# Patient Record
Sex: FEMALE | Race: WHITE | NOT HISPANIC OR LATINO | URBAN - METROPOLITAN AREA
[De-identification: names, ages, dates, MRNs, and addresses within clinical notes are randomized per-mention and may not be internally consistent; named-entity substitution may affect disease eponyms.]

---

## 2023-11-11 ENCOUNTER — OFFICE VISIT (OUTPATIENT)
Dept: URGENT CARE | Facility: CLINIC | Age: 18
End: 2023-11-11
Payer: COMMERCIAL

## 2023-11-11 VITALS
TEMPERATURE: 98 F | SYSTOLIC BLOOD PRESSURE: 116 MMHG | HEIGHT: 59 IN | RESPIRATION RATE: 18 BRPM | DIASTOLIC BLOOD PRESSURE: 78 MMHG | OXYGEN SATURATION: 100 % | BODY MASS INDEX: 34.27 KG/M2 | WEIGHT: 170 LBS | HEART RATE: 100 BPM

## 2023-11-11 DIAGNOSIS — M25.572 ACUTE LEFT ANKLE PAIN: ICD-10-CM

## 2023-11-11 DIAGNOSIS — R52 PAIN: ICD-10-CM
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
## 2023-11-11 DIAGNOSIS — M25.472 LEFT ANKLE SWELLING: ICD-10-CM

## 2023-11-11 DIAGNOSIS — S93.412A SPRAIN OF CALCANEOFIBULAR LIGAMENT OF LEFT ANKLE, INITIAL ENCOUNTER: Primary | ICD-10-CM

## 2023-11-11 PROCEDURE — 99203 OFFICE O/P NEW LOW 30 MIN: CPT | Mod: S$GLB,,, | Performed by: NURSE PRACTITIONER

## 2023-11-11 PROCEDURE — 73610 XR ANKLE COMPLETE 3 VIEW LEFT: ICD-10-PCS | Mod: LT,S$GLB,, | Performed by: RADIOLOGY

## 2023-11-11 PROCEDURE — 99203 PR OFFICE/OUTPT VISIT, NEW, LEVL III, 30-44 MIN: ICD-10-PCS | Mod: S$GLB,,, | Performed by: NURSE PRACTITIONER

## 2023-11-11 PROCEDURE — 73610 X-RAY EXAM OF ANKLE: CPT | Mod: LT,S$GLB,, | Performed by: RADIOLOGY

## 2023-11-11 NOTE — PROGRESS NOTES
"Subjective:      Patient ID: Kalani Whyte is a 18 y.o. female.    Vitals:  height is 4' 11" (1.499 m) and weight is 77.1 kg (170 lb). Her tympanic temperature is 98.2 °F (36.8 °C). Her blood pressure is 116/78 and her pulse is 100. Her respiration is 18 and oxygen saturation is 100%.     Chief Complaint: Ankle Injury (Pt stated left ankle injury from a fall from a standing position. Pt denies any other issues at this time. Pt presents with swelling without deformity.)    Kalani Whyte is a 18 year old female whom presents to urgent care with complaints of left lateral ankle pain and swelling after tripping over a pot hold while jogging. Patient denies any numbness or tingling. She reports she thinks it was an eversion injury. Patient has been applying ice since the injury occurred.     Ankle Injury   The incident occurred 6 to 12 hours ago. The incident occurred in the street. The injury mechanism was a fall. The pain is present in the left ankle. The quality of the pain is described as aching. The pain is at a severity of 7/10. The pain is moderate. The pain has been Constant since onset. She reports no foreign bodies present. The symptoms are aggravated by movement. She has tried ice for the symptoms. The treatment provided no relief.       Musculoskeletal:  Positive for pain, trauma, joint pain and abnormal ROM of joint.   Skin:  Positive for abrasion.      Objective:     Physical Exam   Constitutional: She is oriented to person, place, and time. She appears well-developed. She is cooperative.   HENT:   Head: Normocephalic and atraumatic.   Ears:   Right Ear: Hearing, tympanic membrane, external ear and ear canal normal.   Left Ear: Hearing, tympanic membrane, external ear and ear canal normal.   Nose: Nose normal. No mucosal edema or nasal deformity. No epistaxis. Right sinus exhibits no maxillary sinus tenderness and no frontal sinus tenderness. Left sinus exhibits no maxillary sinus tenderness and no frontal sinus " tenderness.   Mouth/Throat: Uvula is midline, oropharynx is clear and moist and mucous membranes are normal. No trismus in the jaw. Normal dentition. No uvula swelling.   Eyes: Conjunctivae and lids are normal.   Neck: Trachea normal and phonation normal. Neck supple.   Cardiovascular: Normal rate, regular rhythm, normal heart sounds and normal pulses.   Pulmonary/Chest: Effort normal and breath sounds normal.   Abdominal: Normal appearance and bowel sounds are normal. Soft.   Musculoskeletal:         General: Swelling, tenderness and signs of injury present.      Right ankle: She exhibits decreased range of motion and swelling. She exhibits no deformity. Tenderness. CF ligament and posterior TFL tenderness found.      Left ankle: Normal. No tenderness.        Legs:    Neurological: no focal deficit. She is alert, oriented to person, place, and time and at baseline. She exhibits normal muscle tone.   Skin: Skin is warm, dry and intact.   Psychiatric: Her speech is normal and behavior is normal. Judgment and thought content normal.   Nursing note and vitals reviewed.    XR ANKLE COMPLETE 3 VIEW LEFT    Result Date: 11/11/2023  EXAM:  XR ANKLE COMPLETE 3 VIEW LEFT CLINICAL HISTORY:     Lateral left ankle joint pain. TECHNIQUE: 3 views of the left ankle. COMPARISON: None. FINDINGS:  Bone density and architecture are normal. No acute findings. The mortise is intact.      Negative study Finalized on: 11/11/2023 3:23 PM By:  Rickey Pérez MD [facility]# [id number]      2023-11-11 15:25:[id number]    [facility]     Assessment:     1. Sprain of calcaneofibular ligament of left ankle, initial encounter    2. Pain    3. Acute left ankle pain    4. Fall, initial encounter    5. Left ankle swelling        Plan:       Sprain of calcaneofibular ligament of left ankle, initial encounter  -     BANDAGE ELASTIC 6IN ACE  -     CRUTCHES FOR HOME USE    Pain  -     XR ANKLE COMPLETE 3 VIEW LEFT; Future; Expected date: 11/11/2023    Acute left ankle  pain  -     BANDAGE ELASTIC 6IN ACE  -     CRUTCHES FOR HOME USE    Fall, initial encounter  -     BANDAGE ELASTIC 6IN ACE  -     CRUTCHES FOR HOME USE    Left ankle swelling  -     BANDAGE ELASTIC 6IN ACE  -     CRUTCHES FOR HOME USE          Medical Decision Making:   Urgent Care Management:  Previous encounters  were independently reviewed. Discussed with patient  all pertinent information and results. . P.R.I.C.E therapy was discussed. Discussed patient diagnosis and plan of treatment. Additional plan of care as outlined above. Patient  was given all follow up and return instructions. All questions and concerns were addressed at this time. Patient  expresses understanding of information and instructions, and is comfortable with plan.    Patient was instructed to follow up with his Primary Care Provider if no improvement in symptoms in 7-10 DAYS:  or go to ED immediately for any worsening or change in current symptoms. Treatment plan as well as options and alternatives reviewed and discussed with patient. All of the patients questions and concerns were addressed.The patient verbalized understanding and agrees with the discussed plan of care. Patient remained stable and was discharged in no acute distress.Patient noted to effectively ambulate with assistance of crutches during discharge.                   Patient Instructions   PRICE therapy:  Protect the joint with stabilizing brace like a neoprene sleeve or taping.   Rest the extremity--limit activity with this area  Ice 2-3 times a day for 20 minute intervals for first 48 hours or until inflammation has stabilized   Compression to provide support and help prevent swelling  Elevation above heart level to help decrease swelling     You may alternate tylenol and ibuprofen as needed for pain. Follow up with your PCP or Orthopedic specialist if no improvement in symptoms within the next 7-10 days.   Please arrange follow up with your primary medical clinic as soon  as possible. You must understand that you've received an Urgent Care treatment only and that you may be released before all of your medical problems are known or treated. You, the patient, will arrange for follow up as instructed. If your symptoms worsen or fail to improve you should go to the Emergency Room.

## 2023-11-11 NOTE — PATIENT INSTRUCTIONS
PRICE therapy:  Protect the joint with stabilizing brace like a neoprene sleeve or taping.   Rest the extremity--limit activity with this area  Ice 2-3 times a day for 20 minute intervals for first 48 hours or until inflammation has stabilized   Compression to provide support and help prevent swelling  Elevation above heart level to help decrease swelling     You may alternate tylenol and ibuprofen as needed for pain. Follow up with your PCP or Orthopedic specialist if no improvement in symptoms within the next 7-10 days.   Please arrange follow up with your primary medical clinic as soon as possible. You must understand that you've received an Urgent Care treatment only and that you may be released before all of your medical problems are known or treated. You, the patient, will arrange for follow up as instructed. If your symptoms worsen or fail to improve you should go to the Emergency Room.

## 2023-11-15 ENCOUNTER — CLINICAL SUPPORT (OUTPATIENT)
Dept: URGENT CARE | Facility: CLINIC | Age: 18
End: 2023-11-15
Payer: COMMERCIAL

## 2023-11-15 VITALS
TEMPERATURE: 98 F | DIASTOLIC BLOOD PRESSURE: 69 MMHG | SYSTOLIC BLOOD PRESSURE: 106 MMHG | WEIGHT: 170 LBS | BODY MASS INDEX: 34.27 KG/M2 | OXYGEN SATURATION: 99 % | HEIGHT: 59 IN | RESPIRATION RATE: 18 BRPM | HEART RATE: 84 BPM

## 2023-11-15 DIAGNOSIS — Z53.20 PROCEDURE NOT CARRIED OUT BECAUSE OF PATIENT'S DECISION: ICD-10-CM

## 2023-11-15 NOTE — PROGRESS NOTES
"Subjective:      Patient ID: Kalani Whyte is a 18 y.o. female.    Vitals:  height is 4' 11" (1.499 m) and weight is 77.1 kg (170 lb). Her temperature is 98.4 °F (36.9 °C). Her blood pressure is 106/69 and her pulse is 84. Her respiration is 18 and oxygen saturation is 99%.     Chief Complaint: Error and Error    Patient fell Saturday early am, injuring her left ankle and right knee.  She came in that same day and was seen by [facility].  Patient states she is feeling better, just wants to follow up per provider instruction      Fall  The accident occurred 5 to 7 days ago (Saturday morning). The fall occurred while walking. She fell from a height of 1 to 2 ft. She landed on [place]. The volume of blood lost was minimal. The point of impact was the right knee (left ankle and right knee). The pain is present in the right knee (Left ankle). The pain is at a severity of 3/10. The pain is mild. The symptoms are aggravated by ambulation. Pertinent negatives include no abdominal pain, bowel incontinence, fever, headaches, hematuria, loss of consciousness, nausea, numbness, tingling or vomiting. She has tried ice, NSAID, elevation and acetaminophen for the symptoms. The treatment provided moderate relief.     Constitution: Negative for fever.   Gastrointestinal:  Negative for abdominal pain, nausea, vomiting and bowel incontinence.   Genitourinary:  Negative for hematuria.   Neurological:  Negative for headaches, loss of consciousness and numbness.    Objective:     Physical Exam    Assessment:     1. ERRONEOUS ENCOUNTER--DISREGARD    2. Procedure not carried out because of patient's decision        Plan:       ERRONEOUS ENCOUNTER--DISREGARD    Procedure not carried out because of patient's decision                    This encounter was created in error - please disregard.  The patient left the office before the visit was finished.  "

## 2023-11-15 NOTE — LETTER
November 15, 2023      [facility] [address] [place] [address]  Phone: [phone]  Fax: [phone]       Patient: Kalani Whyte   YOB: 2005  Date of Visit: 11/15/2023    To Whom It May Concern:    Buzz Whyte  was at [facility] on 11/15/2023. The patient may return to work/school on 11/15/2023 with no restrictions. If you have any questions or concerns, or if I can be of further assistance, please do not hesitate to contact me.    Sincerely,    Ryanne Schmidt RT

## 2023-11-18 ENCOUNTER — TELEPHONE (OUTPATIENT)
Dept: URGENT CARE | Facility: CLINIC | Age: 18
End: 2023-11-18
Payer: COMMERCIAL